# Patient Record
Sex: FEMALE | Race: WHITE | NOT HISPANIC OR LATINO | URBAN - METROPOLITAN AREA
[De-identification: names, ages, dates, MRNs, and addresses within clinical notes are randomized per-mention and may not be internally consistent; named-entity substitution may affect disease eponyms.]

---

## 2020-01-23 ENCOUNTER — OFFICE VISIT CONVERTED (OUTPATIENT)
Dept: FAMILY MEDICINE CLINIC | Facility: CLINIC | Age: 55
End: 2020-01-23
Attending: INTERNAL MEDICINE

## 2020-01-23 ENCOUNTER — HOSPITAL ENCOUNTER (OUTPATIENT)
Dept: LAB | Facility: HOSPITAL | Age: 55
Discharge: HOME | End: 2020-01-23
Attending: INTERNAL MEDICINE

## 2020-01-23 LAB
ALBUMIN SERPL-MCNC: 4.5 G/DL (ref 3.5–5)
ALBUMIN/GLOB SERPL: 1.5 {RATIO} (ref 1.4–2.6)
ALP SERPL-CCNC: 60 U/L (ref 53–141)
ALT SERPL-CCNC: 16 U/L (ref 10–40)
ANION GAP SERPL CALC-SCNC: 20 MMOL/L (ref 8–19)
AST SERPL-CCNC: 19 U/L (ref 15–50)
BASOPHILS # BLD AUTO: 0.05 10*3/UL (ref 0–0.2)
BASOPHILS NFR BLD AUTO: 0.9 % (ref 0–3)
BILIRUB SERPL-MCNC: 0.31 MG/DL (ref 0.2–1.3)
BUN SERPL-MCNC: 12 MG/DL (ref 5–25)
BUN/CREAT SERPL: 15 {RATIO} (ref 6–20)
CALCIUM SERPL-MCNC: 9.9 MG/DL (ref 8.7–10.4)
CHLORIDE SERPL-SCNC: 101 MMOL/L (ref 99–111)
CHOLEST SERPL-MCNC: 249 MG/DL (ref 107–200)
CHOLEST/HDLC SERPL: 3.4 {RATIO} (ref 3–6)
CONV ABS IMM GRAN: 0.02 10*3/UL (ref 0–0.2)
CONV CO2: 24 MMOL/L (ref 22–32)
CONV IMMATURE GRAN: 0.3 % (ref 0–1.8)
CONV TOTAL PROTEIN: 7.6 G/DL (ref 6.3–8.2)
CREAT UR-MCNC: 0.82 MG/DL (ref 0.5–0.9)
DEPRECATED RDW RBC AUTO: 47.1 FL (ref 36.4–46.3)
EOSINOPHIL # BLD AUTO: 0.2 10*3/UL (ref 0–0.7)
EOSINOPHIL # BLD AUTO: 3.5 % (ref 0–7)
ERYTHROCYTE [DISTWIDTH] IN BLOOD BY AUTOMATED COUNT: 12.9 % (ref 11.7–14.4)
EST. AVERAGE GLUCOSE BLD GHB EST-MCNC: 105 MG/DL
GFR SERPLBLD BASED ON 1.73 SQ M-ARVRAT: >60 ML/MIN/{1.73_M2}
GLOBULIN UR ELPH-MCNC: 3.1 G/DL (ref 2–3.5)
GLUCOSE SERPL-MCNC: 103 MG/DL (ref 65–99)
HBA1C MFR BLD: 5.3 % (ref 3.5–5.7)
HCT VFR BLD AUTO: 43 % (ref 37–47)
HDLC SERPL-MCNC: 73 MG/DL (ref 40–60)
HGB BLD-MCNC: 13.9 G/DL (ref 12–16)
LDLC SERPL CALC-MCNC: 160 MG/DL (ref 70–100)
LYMPHOCYTES # BLD AUTO: 1.72 10*3/UL (ref 1–5)
LYMPHOCYTES NFR BLD AUTO: 29.8 % (ref 20–45)
MCH RBC QN AUTO: 32 PG (ref 27–31)
MCHC RBC AUTO-ENTMCNC: 32.3 G/DL (ref 33–37)
MCV RBC AUTO: 98.9 FL (ref 81–99)
MONOCYTES # BLD AUTO: 0.49 10*3/UL (ref 0.2–1.2)
MONOCYTES NFR BLD AUTO: 8.5 % (ref 3–10)
NEUTROPHILS # BLD AUTO: 3.29 10*3/UL (ref 2–8)
NEUTROPHILS NFR BLD AUTO: 57 % (ref 30–85)
NRBC CBCN: 0 % (ref 0–0.7)
OSMOLALITY SERPL CALC.SUM OF ELEC: 292 MOSM/KG (ref 273–304)
PLATELET # BLD AUTO: 208 10*3/UL (ref 130–400)
PMV BLD AUTO: 11.3 FL (ref 9.4–12.3)
POTASSIUM SERPL-SCNC: 4.1 MMOL/L (ref 3.5–5.3)
RBC # BLD AUTO: 4.35 10*6/UL (ref 4.2–5.4)
SODIUM SERPL-SCNC: 141 MMOL/L (ref 135–147)
TRIGL SERPL-MCNC: 82 MG/DL (ref 40–150)
TSH SERPL-ACNC: 2.13 M[IU]/L (ref 0.27–4.2)
VLDLC SERPL-MCNC: 16 MG/DL (ref 5–37)
WBC # BLD AUTO: 5.77 10*3/UL (ref 4.8–10.8)

## 2020-01-31 ENCOUNTER — CONVERSION ENCOUNTER (OUTPATIENT)
Dept: FAMILY MEDICINE CLINIC | Facility: CLINIC | Age: 55
End: 2020-01-31

## 2020-01-31 ENCOUNTER — OFFICE VISIT CONVERTED (OUTPATIENT)
Dept: FAMILY MEDICINE CLINIC | Facility: CLINIC | Age: 55
End: 2020-01-31
Attending: INTERNAL MEDICINE

## 2020-02-14 ENCOUNTER — HOSPITAL ENCOUNTER (OUTPATIENT)
Dept: GENERAL RADIOLOGY | Facility: HOSPITAL | Age: 55
Discharge: HOME | End: 2020-02-14
Attending: INTERNAL MEDICINE

## 2020-07-23 ENCOUNTER — CONVERSION ENCOUNTER (OUTPATIENT)
Dept: FAMILY MEDICINE CLINIC | Facility: CLINIC | Age: 55
End: 2020-07-23

## 2020-07-23 ENCOUNTER — OFFICE VISIT CONVERTED (OUTPATIENT)
Dept: FAMILY MEDICINE CLINIC | Facility: CLINIC | Age: 55
End: 2020-07-23
Attending: INTERNAL MEDICINE

## 2020-07-23 ENCOUNTER — HOSPITAL ENCOUNTER (OUTPATIENT)
Dept: LAB | Facility: HOSPITAL | Age: 55
Discharge: HOME | End: 2020-07-23
Attending: INTERNAL MEDICINE

## 2020-07-23 ENCOUNTER — HOSPITAL ENCOUNTER (OUTPATIENT)
Dept: GENERAL RADIOLOGY | Facility: HOSPITAL | Age: 55
Discharge: HOME | End: 2020-07-23
Attending: INTERNAL MEDICINE

## 2020-07-23 LAB
CHOLEST SERPL-MCNC: 217 MG/DL (ref 107–200)
CHOLEST/HDLC SERPL: 3.1 {RATIO} (ref 3–6)
HDLC SERPL-MCNC: 71 MG/DL (ref 40–60)
LDLC SERPL CALC-MCNC: 128 MG/DL (ref 70–100)
TRIGL SERPL-MCNC: 88 MG/DL (ref 40–150)
VLDLC SERPL-MCNC: 18 MG/DL (ref 5–37)

## 2020-09-29 ENCOUNTER — OFFICE VISIT CONVERTED (OUTPATIENT)
Dept: GASTROENTEROLOGY | Facility: CLINIC | Age: 55
End: 2020-09-29
Attending: NURSE PRACTITIONER

## 2020-10-08 ENCOUNTER — HOSPITAL ENCOUNTER (OUTPATIENT)
Dept: ULTRASOUND IMAGING | Facility: HOSPITAL | Age: 55
Discharge: HOME | End: 2020-10-08
Attending: NURSE PRACTITIONER

## 2020-10-22 ENCOUNTER — HOSPITAL ENCOUNTER (OUTPATIENT)
Dept: GENERAL RADIOLOGY | Facility: HOSPITAL | Age: 55
Discharge: HOME | End: 2020-10-22
Attending: INTERNAL MEDICINE

## 2020-11-19 ENCOUNTER — HOSPITAL ENCOUNTER (OUTPATIENT)
Dept: GASTROENTEROLOGY | Facility: HOSPITAL | Age: 55
Setting detail: HOSPITAL OUTPATIENT SURGERY
Discharge: HOME | End: 2020-11-19
Attending: INTERNAL MEDICINE

## 2021-01-19 ENCOUNTER — OFFICE VISIT CONVERTED (OUTPATIENT)
Dept: GASTROENTEROLOGY | Facility: CLINIC | Age: 56
End: 2021-01-19
Attending: NURSE PRACTITIONER

## 2021-01-20 ENCOUNTER — CONVERSION ENCOUNTER (OUTPATIENT)
Dept: FAMILY MEDICINE CLINIC | Facility: CLINIC | Age: 56
End: 2021-01-20

## 2021-01-20 ENCOUNTER — OFFICE VISIT CONVERTED (OUTPATIENT)
Dept: FAMILY MEDICINE CLINIC | Facility: CLINIC | Age: 56
End: 2021-01-20
Attending: PHYSICIAN ASSISTANT

## 2021-05-10 NOTE — H&P
"   History and Physical      Patient Name: Zainab \"Michael\" Michelle   Patient ID: [id number]   Sex: Female   YOB: 1965    Primary Care Provider: Mikel Farrar DO   Referring Provider: Mikel Farrar DO    Visit Date: September 29, 2020    Provider: ARIELA Rose   Location: [facility]   Location Address: [address] [place] [address]  [id number]   Location Phone: [phone]          Chief Complaint  · Stomach pain   · diarrhea   · constipation   · blood in stool       History Of Present Illness  Zainab \"Michael\" SUKHWINDER Martinez is a 55 year old /White female who presents to the office today.      New pt presents w diarrhea alt w constipation, abd cramping, had issues at work d/t this. Started about 6 mo ago. Pt has epigastric pain, can be crampy, occurs after meals. Tried different diets and no help. Diarrhea usually occurs w pain, and is more predominant than constipation, has seen blood in stool. Bentyl is helping w pain, has been on 1 week, and sx not as often. Sx were several times a week before getting Bentyl.   Also has GERD, on Omeprazole for 20 years, states she can't eat w/o it; states has been on Reglan for 10 yrs for gastroparesis. +nausea, no vomiting. NO unint wt loss.   Last colonoscopy 10 yrs ago.     ER visit for abdominal pain 9/18/2020: CT the abdomen and pelvis with contrast was negative other than indeterminate lesion in the right kidney, CBC unremarkable, CMP unremarkable, lipase negative       Past Medical History  COPD (chronic obstructive pulmonary disease); GERD; Screening for breast cancer; Screening for colon cancer         Past Surgical History  Hysterectomy; Lumpectomy of left breast; Rhinoplasty         Medication List  Name Date Started Instructions   dicyclomine 20 mg oral tablet  --    duloxetine 60 mg oral capsule,delayed release(DR/EC) 07/23/2020 TAKE 1 CAPSULE BY MOUTH ONCE DAILY FOR 30 DAYS   Effexor  mg oral " capsule,extended release 24hr 07/23/2020 take 1 capsule (150 mg) by oral route once daily for 90 days   Mobic 7.5 mg oral tablet 07/23/2020 take 1 tablet by oral route 2 times a day as needed for 30 days   omeprazole 40 mg oral capsule,delayed release(DR/EC) 07/23/2020 take 1 capsule (40 mg) by oral route once daily before a meal for 90 days   Reglan 10 mg oral tablet 07/23/2020 take 1 tablet (10 mg) by oral route once daily for 90 days   Seroquel 50 mg oral tablet 07/23/2020 take 1 tablet by oral route daily for 90 days   tizanidine 4 mg oral capsule 07/23/2020 take 1 capsule (4 mg) by oral route every 8 hours as needed for 30 days         Allergy List  Codeine Phosphate; Codeine Sulfate         Family Medical History  Family history of colon cancer; Family history of lung cancer; Family history of heart disease; Family history of diabetes mellitus; Family history of hypertension; Family history of esophageal cancer; Family history of bladder cancer         Social History  Alcohol (Light); ; Sedentary; Tobacco (Never)         Review of Systems  · Constitutional  o Admits  o : good general health lately, no acute distress  · Eyes  o Denies  o : cataracts, glaucoma  · HENT  o Denies  o : hearing problems, trouble swallowing  · Cardiovascular  o Denies  o : heart valve problems, chest pain  · Respiratory  o Admits  o : shortness of breath  o Denies  o : asthma  · Gastrointestinal  o Denies  o : additional gastrointestinal symptoms except as noted in the HPI  · Genitourinary  o Admits  o : kidney stone  o Denies  o : dysuria  · Integument  o Denies  o : rashes, sores  · Neurologic  o Denies  o : strokes, seizure activity  · Musculoskeletal  o Admits  o : arthritis, bone or joint pain  · Psychiatric  o Admits  o : other mental disorders, pleasant affect--+Bipolar  o Denies  o : depression  · Heme-Lymph  o Denies  o : bleeding disorder  · Allergic-Immunologic  o Admits  o : seasonal allergies      Vitals  Date  "Time BP Position Site L\R Cuff Size HR RR TEMP (F) WT  HT  BMI kg/m2 BSA m2 O2 Sat HC       07/23/2020 08:05 /53 Sitting    76 - R   225lbs 6oz 5'  7\" 35.3 2.2 95 %    09/29/2020 02:24 /90 Sitting    77 - R  97.9 228lbs 2oz 5'  7\" 35.73 2.21           Physical Examination  · Constitutional  o Appearance  o : well developed, well-nourished, in no acute distress  · Head and Face  o Head  o :   § Inspection  § : atraumatic, normocephalic  · Eyes  o Sclerae  o : sclerae white, no sclerae icterus  · Neck  o Inspection/Palpation  o : supple  · Respiratory  o Respiratory Effort  o : breathing unlabored  o Inspection of Chest  o : normal appearance, no retractions  · Cardiovascular  o Peripheral Vascular System  o :   § Extremities  § : no cyanosis, clubbing or edema  · Gastrointestinal  o Abdominal Examination  o : soft, nontender to palpation--tender epigastric and RUQ  · Skin and Subcutaneous Tissue  o General Inspection  o : no lesions present, no rashes present  · Neurologic  o Mental Status Examination  o :   § Orientation  § : grossly oriented to person, place and time  § Speech/Language  § : communication ability within normal limits, voice quality normal, articulation of speech normal, no evidence of aphasia  § Attention  § : attention normal, concentration abilities normal  o Sensation  o : grossly intact  o Gait and Station  o :   § Gait Screening  § : normal gait  · Psychiatric  o General  o : Alert and oriented x3  o Mood and Affect  o : Mood and affect are appropriate to circumstances          Assessment  · Pre-op exam     V72.84/Z01.818  · Abdominal pain     789.00/R10.9  · Diarrhea     787.91/R19.7  · Gastroparesis     536.3/K31.84  reported  · Heartburn     787.1/R12  · Blood in stool     578.1/K92.1      Plan  · Orders  o RUQ US (right upper quadrant ultrasound) (04256) - - 09/29/2020  o [facility] Pre-Op Covid-19 Screening (63758) - - 09/29/2020  · Medications  o Golytely 236-22.74-6.74 -5.86 gram " oral recon soln   SIG: take as directed   DISP: (1) 4000 ml bottle with 0 refills  Prescribed on 09/29/2020     o hyoscyamine sulfate 0.125 mg sublingual tablet, sublingual   SIG: place 1 tablet under tongue by translingual route every 4 to 6 hours for 30 days PRN diarrhea/abd pain   DISP: (90) tablets with 1 refills  Prescribed on 09/29/2020     o dicyclomine 20 mg oral tablet   SIG: ---   DISP: (0) tablet with 0 refills  Discontinued on 09/29/2020     o Medications have been Reconciled  o Transition of Care or Provider Policy  · Instructions  o Please Sign Permit for: EGD/COLONOSCOPY  o Indication: Abd pain, persistent HB, diarrhea, blood in stool  o Surgical Risk and Benefits: Possible risks/complications, benefits, and alternatives to surgical or invasive procedure have been explained to patient and/or legal guardian; Patient has been evaluated and can tolerate anesthesia and/or sedation. Risks, benefits, and alternatives to anesthesia and sedation have been explained to patient and/or legal guardian.  o Follow Up after Procedure.  o Encouraged to follow-up with Primary Care Provider for preventative care.  o Patient was educated/instructed on their diagnosis, treatment and medications prior to discharge from the clinic today.  o Patient instructed to seek medical attention urgently for new or worsening symptoms.  o Pt is aware of long term risks of Reglan            Electronically Signed by: ARIELA Rose -Author on September 29, 2020 03:01:33 PM

## 2021-05-13 NOTE — PROGRESS NOTES
Progress Note      Patient Name: Zainab Martinez   Patient ID: [id number]   Sex: Female   YOB: 1965    Primary Care Provider: Mikel Farrar DO   Referring Provider: Mikel Farrar DO    Visit Date: July 23, 2020    Provider: Mikel Farrar DO   Location: [facility]   Location Address: [address] [place] [address], [address]  [place], [place]  [id number]   Location Phone: [phone]          Chief Complaint  · 6 month f/u  · Arthritis   · Neuropathy      History Of Present Illness  Zainab Martinez is a 55 year old /White female who presents for evaluation and treatment of:      Pt is here for 6 month f/u on arthritis and neuropathy.    Pt states that she fell in February, on her garage steps injury her right leg. Pt states that it was black and blue but has improved over time. Pt states that it still bothers her some and does occasionally have some swelling. Pt thinks that she could of fractured it and is hoping to get xray completed.    Pt wants to see about getting increase in duloxetine d/t to more cramps in her feet and legs. Patient states it does help some but feels like she got adjusted to the original and was becoming less effective.    Pt wants to get recheck cholesterol labs.    Patient hoping to get re-referred to GI as initial evaluation was cancelled due to coronavirus. Patient hoping to go see a GI specialist for her worsening gastroparesis for possible evaluation for gastric stimulator. Patient has been on Reglan for long period of time. I discussed with her risks of doing so.           Past Medical History  Disease Name Date Onset Notes   COPD (chronic obstructive pulmonary disease) --  --    GERD --  --    Screening for breast cancer 2018 In [place]   Screening for colon cancer --  In [place]         Past Surgical History  Procedure Name Date Notes   Hysterectomy --  --    Lumpectomy of left breast --  --    Rhinoplasty --  --          Medication List  Name Date  Started Instructions   duloxetine 60 mg oral capsule,delayed release(DR/EC) 07/23/2020 TAKE 1 CAPSULE BY MOUTH ONCE DAILY FOR 30 DAYS   Effexor  mg oral capsule,extended release 24hr 07/23/2020 take 1 capsule (150 mg) by oral route once daily for 90 days   Mobic 7.5 mg oral tablet 07/23/2020 take 1 tablet by oral route 2 times a day as needed for 30 days   omeprazole 40 mg oral capsule,delayed release(DR/EC) 07/23/2020 take 1 capsule (40 mg) by oral route once daily before a meal for 90 days   Reglan 10 mg oral tablet 07/23/2020 take 1 tablet (10 mg) by oral route once daily for 90 days   Seroquel 50 mg oral tablet 07/23/2020 take 1 tablet by oral route daily for 90 days   tizanidine 4 mg oral capsule 07/23/2020 take 1 capsule (4 mg) by oral route every 8 hours as needed for 30 days         Allergy List  Allergen Name Date Reaction Notes   Codeine Phosphate --  --  --    Codeine Sulfate --  --  --          Family Medical History  Disease Name Relative/Age Notes   Family history of colon cancer Grandfather (maternal)/   --    Family history of lung cancer Brother/   --    Family history of heart disease Aunt/  Father/  Mother/  Uncle/   --    Family history of diabetes mellitus Grandmother (maternal)/   --    Family history of hypertension Aunt/  Mother/  Uncle/   --    Family history of esophageal cancer Aunt/   --    Family history of bladder cancer Mother/   --          Social History  Finding Status Start/Stop Quantity Notes   Alcohol Light --/-- rarely --     --  --/-- --  --    Sedentary --  --/-- --  --    Tobacco Never --/-- --  --          Review of Systems  · Constitutional  o Denies  o : fatigue, night sweats  · Eyes  o Denies  o : double vision, blurred vision  · HENT  o Denies  o : vertigo, recent head injury  · Breasts  o Denies  o : abnormal changes in breast size, additional breast symptoms except as noted in the HPI  · Cardiovascular  o Denies  o : chest pain, irregular heart  "beats  · Respiratory  o Denies  o : shortness of breath, productive cough  · Gastrointestinal  o Admits  o : nausea, reflux, early satiety  o Denies  o : vomiting, abdominal pain  · Genitourinary  o Denies  o : dysuria, urinary retention  · Integument  o Denies  o : hair growth change, new skin lesions  · Neurologic  o Admits  o : incoordination, tingling or numbness  o Denies  o : altered mental status, seizures  · Musculoskeletal  o Admits  o : joint pain, leg pain, muscle cramps  o Denies  o : joint swelling, limitation of motion  · Endocrine  o Denies  o : cold intolerance, heat intolerance  · Psychiatric  o Admits  o : anxiety, depression  o Denies  o : suicidal ideation, homicidal ideation  · Heme-Lymph  o Denies  o : petechiae, lymph node enlargement or tenderness  · Allergic-Immunologic  o Denies  o : frequent illnesses      Vitals  Date Time BP Position Site L\R Cuff Size HR RR TEMP (F) WT  HT  BMI kg/m2 BSA m2 O2 Sat HC       01/31/2020 11:14 /82 Sitting    68 - R   220lbs 6oz 5'  7\" 34.52 2.17 97 %    07/23/2020 08:05 /53 Sitting    76 - R   225lbs 6oz 5'  7\" 35.3 2.2 95 %          Physical Examination  · Constitutional  o Appearance  o : alert, oriented, in no acute distress, well developed, well-nourished  · Eyes  o Vision  o : Conjuntivae: Normal, Sclerae white, Pupils: PERRL, Cornea: Clear, no lesions bilateral  · Ears, Nose, Mouth and Throat  o Ears  o : Ext. Ears: Normal shape, Non tender, EACs: Normal , Tragus intact bilaterally, Hearing: intact to conversational voice bilaterally  o Nose  o : No nasal discharge, Mucosa: normal, Septum: midline, Sinuses: Nontender  o Throat  o : Oropharynx: no inflmation or lesions, no purulence or drainage  · Neck  o Inspection/Palpation  o : Supple, no masses or tenderness, no deformities, Trachea: Midline, ROM: with in normal limits, no neck stiffness, no lymphadenopathy  o Thyroid  o : no thyomegaly, no palpabale masses "   · Respiratory  o Auscultation of Lungs  o : normal breath sounds throughout, no wheeze, rhonchi, or crackles  · Cardiovascular  o Heart  o : Regular rate and rhythm, Normal S1,S2 , No cardiac murmers, No S3 or S4 gallop or rubs  · Gastrointestinal  o Abdominal Examination  o : abdomen soft, nontender, non distended, no rigidity, gaurding, rebound tenderness, no ventral hernias present  o Liver and spleen  o : no hepatomegaly present, liver nontender to palpation, spleen not palpable  · Skin and Subcutaneous Tissue  o General Inspection  o : no rashes on visible skin, normal skin color, warm and dry  o Digits and Nails  o : no clubbing, cyanosis, deformities or edema present, normal appearing nails  · Neurologic  o Mental Status Examination  o : alert and oriented to time, place, and person. Gait and Station: normal gait, able to stand without difficulty. CN 2-12 grossly intact   · Psychiatric  o Judgment and Insight  o : judgment and insight intact  o Mood and Affect  o : normal mood and affect          Assessment  · Screening for depression     V79.0/Z13.89  · Hyperlipidemia     272.4/E78.5  · Polyarthralgia     719.49/M25.50  · Fall     E888.9/W19.XXXA  · Right leg pain     729.5/M79.604  Check tib/fib xray and ankle xray as well to look for possible fracture.  · Right ankle pain     719.47/M25.571  · Gastroparesis     536.3/K31.84  Will send another referral to GI for evaluation for possible gastric pacemaker.  · Chronic pain     338.29/G89.29  · Anxiety and depression       Anxiety disorder, unspecified     300.00/F41.9  Major depressive disorder, single episode, unspecified     300.00/F32.9  · Muscle spasm     728.85/M62.838    Problems Reconciled  Plan  · Orders  o ACO-18: Negative screen for clinical depression using a standardized tool () - V79.0/Z13.89 - 07/23/2020  o ACO - Pt declines to or was not able to provide an Advance Care Plan or name a Surrogate Decision Maker (1124F) - -  07/23/2020  o Lipid Panel [facility] (34454) - 272.4/E78.5 - 07/23/2020  o ACO-39: Current medications updated and reviewed () - - 07/23/2020  o ACO-15: Pneumococcal Vaccine Administered or Previously Received (4040F) - - 07/23/2020  o ACO-14: Influenza immunization was not administered for reasons documented () - - 07/23/2020  o Tib/Fib (Right) 2 views X-Ray [facility] ([id number]) - - 07/23/2020  o Ankle (Right) 3 views X-Ray [facility] ([id number]) - - 07/23/2020  o GASTROENTEROLOGY (GASTR) - 536.3/K31.84 - 07/23/2020   [facility], wants to talk about gastric stimulator  · Medications  o duloxetine 60 mg oral capsule,delayed release(DR/EC)   SIG: TAKE 1 CAPSULE BY MOUTH ONCE DAILY FOR 30 DAYS   DISP: (30) capsules with 6 refills  Adjusted on 07/23/2020     o Effexor  mg oral capsule,extended release 24hr   SIG: take 1 capsule (150 mg) by oral route once daily for 90 days   DISP: (90) capsule with 1 refills  Refilled on 07/23/2020     o Mobic 7.5 mg oral tablet   SIG: take 1 tablet by oral route 2 times a day as needed for 30 days   DISP: (60) tablets with 3 refills  Refilled on 07/23/2020     o omeprazole 40 mg oral capsule,delayed release(DR/EC)   SIG: take 1 capsule (40 mg) by oral route once daily before a meal for 90 days   DISP: (90) capsule with 1 refills  Refilled on 07/23/2020     o Reglan 10 mg oral tablet   SIG: take 1 tablet (10 mg) by oral route once daily for 90 days   DISP: (90) tablet with 1 refills  Refilled on 07/23/2020     o Seroquel 50 mg oral tablet   SIG: take 1 tablet by oral route daily for 90 days   DISP: (90) tablet with 1 refills  Refilled on 07/23/2020     o tizanidine 4 mg oral capsule   SIG: take 1 capsule (4 mg) by oral route every 8 hours as needed for 30 days   DISP: (90) capsules with 2 refills  Refilled on 07/23/2020     o amoxicillin 875 mg oral tablet   SIG: take 1 tablet (875 mg) by oral route every 12 hours for 7 days   DISP: (14) tablets with 0  refills  Discontinued on 07/23/2020     o cetirizine 5 mg oral tablet   SIG: take 1 tablet by oral route daily for 30 days   DISP: (30) tablets with 2 refills  Discontinued on 07/23/2020     o Cymbalta 30 mg oral capsule,delayed release(DR/EC)   SIG: take 1 capsule (30 mg) by oral route once daily for 30 days   DISP: (30) capsules with 3 refills  Discontinued on 07/23/2020     o fluticasone propionate 50 mcg/actuation nasal spray,suspension   SIG: spray 1 - 2 sprays in each nostril by intranasal route once daily   DISP: (1) 9.9 ml aer w/adap with 2 refills  Discontinued on 07/23/2020     o Zofran 4 mg oral tablet   SIG: take 1 tablet by oral route Q8H PRN for nausea   DISP: (21) tablets with 0 refills  Discontinued on 07/23/2020     o Medications have been Reconciled  o Transition of Care or Provider Policy  · Instructions  o Depression Screen completed and scanned into the EMR under the designated folder within the patient's documents.  o Today's PHQ-9 result is 1  o Recommended exercise program to assist with cholesterol, weight loss and overall health improvement.  o Advised that cheeses and other sources of dairy fats, animal fats, fast food, and the extras (candy, pastries, pies, doughnuts and cookies) all contain LDL raising nutrients. Advised to increase fruits, vegetables, whole grains, and to monitor portion sizes.   o (NSAID) Non-steroidal Anti-inflammatory medication was recommended/prescribed. Ensure you take this medication with food as directed. Do not use Motrin/ibuprofen/Advil while using the anti-inflammatory medication prescribed.  o Take all medications as prescribed/directed.  o Patient was educated/instructed on their diagnosis, treatment and medications prior to discharge from the clinic today.  o Patient instructed to seek medical attention urgently for new or worsening symptoms.  o Call the office with any concerns or questions.  o Bring all medicines with their bottles to each office  visit.  o Minutes spent with patient including greater than 50% in Education/Counseling/Care Coordination.  o Time spent with the patient was minutes, more than 50% face to face.  o Chronic conditions reviewed and taken into consideration for today's treatment plan.  o Discussed Covid-19 precautions including, but not limited to, social distancing, avoid touching your face, and hand washing.   o Electronically Identified Patient Education Materials Provided Electronically  · Disposition  o Follow up in three months            Electronically Signed by: Mikel Farrar DO -Author on July 29, 2020 01:25:05 PM

## 2021-05-14 VITALS
BODY MASS INDEX: 36.96 KG/M2 | TEMPERATURE: 98.4 F | WEIGHT: 235.5 LBS | HEART RATE: 80 BPM | DIASTOLIC BLOOD PRESSURE: 82 MMHG | SYSTOLIC BLOOD PRESSURE: 127 MMHG | OXYGEN SATURATION: 97 % | HEIGHT: 67 IN

## 2021-05-14 VITALS
HEIGHT: 67 IN | HEART RATE: 81 BPM | DIASTOLIC BLOOD PRESSURE: 85 MMHG | WEIGHT: 231.5 LBS | SYSTOLIC BLOOD PRESSURE: 141 MMHG | TEMPERATURE: 98.1 F | BODY MASS INDEX: 36.34 KG/M2

## 2021-05-14 VITALS
HEART RATE: 77 BPM | BODY MASS INDEX: 35.8 KG/M2 | HEIGHT: 67 IN | WEIGHT: 228.12 LBS | TEMPERATURE: 97.9 F | DIASTOLIC BLOOD PRESSURE: 90 MMHG | SYSTOLIC BLOOD PRESSURE: 149 MMHG

## 2021-05-14 NOTE — PROGRESS NOTES
Progress Note      Patient Name: Elisa Martinez   Patient ID: [id number]   Sex: Female   YOB: 1965    Primary Care Provider: Yu CORONADO   Referring Provider: Yu CORONADO    Visit Date: January 20, 2021    Provider: IVONNE Charles   Location: [facility]   Location Address: [address] [place] [address], [address]  [place], [place]  [id number]   Location Phone: [phone]          Chief Complaint  · follow up   · medication refills       History Of Present Illness  Elisa Martinez is a 56 year old /White female who presents for evaluation and treatment of:      patient is here for follow up and medication     She is moving to [place] next month.    Anxiety/Depression: She is taking Effexor, Duloxetine and Seroquel with good results. Denies SI. Denies side effects.    Gastroparesis/Gerd: Patient is taking Reglan, Omeprazole and hyoscyamine with good results. She is followed by Yu Rojas and Dr Wild and states she was just seen yesterday for follow up. CLN 11/2020. She is requesting refill on GI meds; since she just saw GI yesterday and is on Reglan with black box warning; I suggested she contact GI for med refills.    COPD: She states she uses Advair inhaler but doesn't know what strength it is, she only uses it prn. She is having some wheezing today.    OA: Pt has h/o previous back surgery/fusion and has oa and frequent muscle spasms and tightness in neck and upper back.                 Past Medical History  Disease Name Date Onset Notes   COPD (chronic obstructive pulmonary disease) --  --    GERD --  --    Screening for breast cancer 2018 In [place]   Screening for colon cancer --  In [place]         Past Surgical History  Procedure Name Date Notes   Hysterectomy --  --    Lumpectomy of left breast --  --    Rhinoplasty --  --          Medication List  Name Date Started Instructions   duloxetine 60 mg oral capsule,delayed  release(DR/EC) 07/23/2020 TAKE 1 CAPSULE BY MOUTH ONCE DAILY FOR 30 DAYS   Effexor  mg oral capsule,extended release 24hr 07/23/2020 take 1 capsule (150 mg) by oral route once daily for 90 days   hyoscyamine sulfate 0.125 mg sublingual tablet, sublingual 09/29/2020 place 1 tablet under tongue by translingual route every 4 to 6 hours for 30 days PRN diarrhea/abd pain   Mobic 7.5 mg oral tablet 07/23/2020 take 1 tablet by oral route 2 times a day as needed for 30 days   omeprazole 40 mg oral capsule,delayed release(DR/EC) 07/23/2020 take 1 capsule (40 mg) by oral route once daily before a meal for 90 days   Reglan 10 mg oral tablet 07/23/2020 take 1 tablet (10 mg) by oral route once daily for 90 days   Seroquel 50 mg oral tablet 07/23/2020 take 1 tablet by oral route daily for 90 days   tizanidine 4 mg oral capsule 07/23/2020 take 1 capsule (4 mg) by oral route every 8 hours as needed for 30 days         Allergy List  Allergen Name Date Reaction Notes   Codeine Phosphate --  --  --    Codeine Sulfate --  --  --        Allergies Reconciled  Family Medical History  Disease Name Relative/Age Notes   Family history of colon cancer Grandfather (maternal)/60s  Uncle/78   --    Family history of lung cancer Brother/   --    Family history of heart disease Aunt/  Father/  Mother/  Uncle/   --    Family history of diabetes mellitus Grandmother (maternal)/   --    Family history of hypertension Aunt/  Mother/  Uncle/   --    Family history of esophageal cancer Aunt/   --    Family history of bladder cancer Mother/   --          Social History  Finding Status Start/Stop Quantity Notes   Alcohol Light --/-- rarely --     --  --/-- --  --    Sedentary --  --/-- --  --    Tobacco Never --/-- --  --          Review of Systems  · Constitutional  o Denies  o : fever, fatigue, weight loss, weight gain  · Cardiovascular  o Denies  o : lower extremity edema, claudication, chest pressure,  "palpitations  · Respiratory  o Admits  o : wheezing  o Denies  o : shortness of breath, cough, hemoptysis, dyspnea on exertion  · Gastrointestinal  o Denies  o : nausea, vomiting, diarrhea, constipation, abdominal pain      Vitals  Date Time BP Position Site L\R Cuff Size HR RR TEMP (F) WT  HT  BMI kg/m2 BSA m2 O2 Sat FR L/min FiO2 HC       01/20/2021 11:13 /82 Sitting    80 - R  98.4 235lbs 8oz 5'  7\" 36.88 2.25 97 %  21%          Physical Examination  · Constitutional  o Appearance  o : well developed, well-nourished, no acute distress  · Head and Face  o Head  o : normocephalic, atraumatic  · Neck  o Inspection/Palpation  o : normal appearance, no masses or tenderness, trachea midline  o Thyroid  o : gland size normal, nontender, no nodules or masses present on palpation  · Respiratory  o Respiratory Effort  o : breathing unlabored  o Inspection of Chest  o : chest rise symmetric bilaterally  o Auscultation of Lungs  o : mild expiratory wheezing otherwise clear bilaterally.  · Cardiovascular  o Heart  o :   § Auscultation of Heart  § : regular rate and rhythm, no murmurs, gallops or rubs  o Peripheral Vascular System  o :   § Extremities  § : no edema  · Lymphatic  o Neck  o : no cervical lymphadenopathy, no supraclavicular lymphadenopathy  · Psychiatric  o Mood and Affect  o : mood normal, affect appropriate              Assessment  · COPD (chronic obstructive pulmonary disease)     496/J44.9  · Depression     311/F32.9  · GERD (gastroesophageal reflux disease)     530.81/K21.9  · Osteoarthritis     715.90/M19.90  · Other long term (current) drug therapy     V58.69/Z79.899  · Muscle spasm     728.85/M62.838  · Gastroparesis     536.3/K31.84       Patient is unsure of dose of Advair and will call back for refill. Pt to call GI for med refills of GI meds as just had an appt yesterday; unsure of plan of care and she is on Reglan which has a black box warning. Other meds refilled for 6mths.     Problems " Reconciled  Plan  · Orders  o ACO-14: Influenza immunization administered or previously received [facility] () - - 01/20/2021  o ACO-19: Colorectal cancer screening results documented and reviewed (3017F) - - 01/20/2021  o ACO-39: Current medications updated and reviewed (, 1159F) - - 01/20/2021  · Medications  o duloxetine 60 mg oral capsule,delayed release(DR/EC)   SIG: TAKE 1 CAPSULE BY MOUTH ONCE DAILY FOR 30 DAYS   DISP: (90) Capsule with 1 refills  Refilled on 01/20/2021     o Effexor  mg oral capsule,extended release 24hr   SIG: take 1 capsule (150 mg) by oral route once daily for 90 days   DISP: (90) Capsule with 1 refills  Refilled on 01/20/2021     o Mobic 7.5 mg oral tablet   SIG: take 1 tablet by oral route 2 times a day as needed for 90 days   DISP: (180) Tablet with 1 refills  Refilled on 01/20/2021     o Seroquel 50 mg oral tablet   SIG: take 1 tablet by oral route daily for 90 days   DISP: (90) Tablet with 1 refills  Refilled on 01/20/2021     o tizanidine 4 mg oral capsule   SIG: take 1 capsule (4 mg) by oral route every 8 hours as needed for 30 days   DISP: (90) Capsule with 2 refills  Refilled on 01/20/2021     o Medications have been Reconciled  o Transition of Care or Provider Policy  · Instructions  o Maintain a healthy weight. Avoid tight fitting clothes. Avoid fried, fatty foods, tomato sauce, chocolate, mint, garlic, onion, alcohol. caffeine. Eat smaller meals, dont lie down after a meal, dont smoke. Elevate the head of your bed 6-9 inches.  o Tylenol may be used as needed every 4-6 hours for pain.  o Patient is taking medications as prescribed and doing well.   o Patient was educated/instructed on their diagnosis, treatment and medications prior to discharge from the clinic today.  o Patient instructed to seek medical attention urgently for new or worsening symptoms.  o Call the office with any concerns or questions.  o Chronic conditions reviewed and taken into consideration for  today's treatment plan.  o Discussed Covid-19 precautions including, but not limited to, social distancing, avoid touching your face, and hand washing.   o Electronically Identified Patient Education Materials Provided Electronically            Electronically Signed by: IVONNE Charles -Author on January 20, 2021 12:12:01 PM

## 2021-05-14 NOTE — PROGRESS NOTES
Progress Note      Patient Name: Zainab Martinez   Patient ID: [id number]   Sex: Female   YOB: 1965    Primary Care Provider: Yu CORONADO   Referring Provider: Yu CORONADO    Visit Date: January 19, 2021    Provider: ARIELA Rose   Location: [facility] [address]   Location Address: [address] [place] [address]  [id number]   Location Phone: [phone]          Chief Complaint  · Follow up       History Of Present Illness  Zainab Martinez is a 56 year old /White female who presents to the office today.      Patient initially presented September 2020 with diarrhea alternating with constipation, abdominal cramping for 6 months.  Had seen blood in stool.  Bentyl helped a little bit with pain.  Also complained of GERD and on omeprazole for 20 years, reported being on Reglan for 10 years for gastroparesis, +nausea but no vomiting.  Hyoscyamine was prescribed and scopes were ordered.    10/8/2020 RUQ US:  fatty liver suggested, right kidney lesion further imaging was recommended  CT of the abdomen and pelvis with and without contrast ordered by PCP 10/22/2020: Moderately large colonic stool burden, proteinaceous or hemorrhagic cyst in the right kidney    EGD colonoscopy 11/19/2020: Small hiatal hernia, many mixed nonbleeding polyps 3 to 4 mm in the stomach body with biopsy takenfundic gland polyp, normal duodenumbiopsy negative; good prep, multiple nonbleeding diverticula seen in the sigmoid, normal mucosa otherwise, grade 2 internal hemorrhoids, random colon biopsies negative    Taking Reglan still, states she will have N/V without this. Risks have been discussed.  Pt states she's off keto diet, unfortunately she is feeling constipated now and is gaining weight. Pt does report seeing rectal bleeding w constipation episode.  Taking Hyoscyamine PRN abd cramps and it works well.         Past Medical History  COPD (chronic obstructive pulmonary  disease); GERD; Screening for breast cancer; Screening for colon cancer         Past Surgical History  Hysterectomy; Lumpectomy of left breast; Rhinoplasty         Medication List  Name Date Started Instructions   duloxetine 60 mg oral capsule,delayed release(DR/EC) 07/23/2020 TAKE 1 CAPSULE BY MOUTH ONCE DAILY FOR 30 DAYS   Effexor  mg oral capsule,extended release 24hr 07/23/2020 take 1 capsule (150 mg) by oral route once daily for 90 days   hyoscyamine sulfate 0.125 mg sublingual tablet, sublingual 09/29/2020 place 1 tablet under tongue by translingual route every 4 to 6 hours for 30 days PRN diarrhea/abd pain   Mobic 7.5 mg oral tablet 07/23/2020 take 1 tablet by oral route 2 times a day as needed for 30 days   omeprazole 40 mg oral capsule,delayed release(DR/EC) 07/23/2020 take 1 capsule (40 mg) by oral route once daily before a meal for 90 days   Reglan 10 mg oral tablet 07/23/2020 take 1 tablet (10 mg) by oral route once daily for 90 days   Seroquel 50 mg oral tablet 07/23/2020 take 1 tablet by oral route daily for 90 days   tizanidine 4 mg oral capsule 07/23/2020 take 1 capsule (4 mg) by oral route every 8 hours as needed for 30 days         Allergy List  Codeine Phosphate; Codeine Sulfate         Family Medical History  Family history of colon cancer; Family history of lung cancer; Family history of heart disease; Family history of diabetes mellitus; Family history of hypertension; Family history of esophageal cancer; Family history of bladder cancer         Social History  Alcohol (Light); ; Sedentary; Tobacco (Never)         Review of Systems  · Constitutional  o Admits  o : good general health lately, no acute distress  · Gastrointestinal  o Denies  o : additional gastrointestinal symptoms except as noted in the HPI  · Psychiatric  o Admits  o : pleasant affect      Physical Examination  · Constitutional  o Appearance  o : well developed, well-nourished, in no acute distress  · Head and  Face  o Head  o :   § Inspection  § : atraumatic, normocephalic  · Eyes  o Sclerae  o : sclerae white, no sclerae icterus  · Neck  o Inspection/Palpation  o : supple  · Respiratory  o Respiratory Effort  o : breathing unlabored  o Inspection of Chest  o : normal appearance, no retractions  · Cardiovascular  o Peripheral Vascular System  o :   § Extremities  § : no cyanosis, clubbing or edema  · Gastrointestinal  o Abdominal Examination  o : soft, nontender to palpation--mild tenderness LUQ  · Skin and Subcutaneous Tissue  o General Inspection  o : no lesions present, no rashes present  · Neurologic  o Mental Status Examination  o :   § Orientation  § : grossly oriented to person, place and time  § Speech/Language  § : communication ability within normal limits, voice quality normal, articulation of speech normal, no evidence of aphasia  § Attention  § : attention normal, concentration abilities normal  o Sensation  o : grossly intact  o Gait and Station  o :   § Gait Screening  § : normal gait  · Psychiatric  o General  o : Alert and oriented x3  o Mood and Affect  o : Mood and affect are appropriate to circumstances          Assessment  · Constipation     564.00/K59.00  · Diverticulosis     562.10/K57.90  · Fatty liver     571.8/K76.0  · Gastroparesis     536.3/K31.84  reported  · Hiatal hernia     553.3/K44.9  · Internal hemorrhoid     455.0/K64.8  · Fundic gland polyps of stomach, benign     211.1/D13.1      Plan  · Medications  o Anucort-HC 25 mg rectal suppository   SIG: insert 1 suppository (25 mg) by rectal route 2 times per day for 14 days   DISP: (28) Suppository with 0 refills  Prescribed on 01/19/2021     · Instructions  o Encouraged to follow-up with Primary Care Provider for preventative care.  o Patient was educated/instructed on their diagnosis, treatment and medications prior to discharge from the clinic today.  o Patient instructed to seek medical attention urgently for new or worsening  symptoms.  o F/U PRN. Advised pt to call if any GI symptoms such as change in bowel pattern, abd pain, wt loss, or blood in stool.   o Pt states she is moving to [place]. She was advised to get and take a copy of her records. She will f/u there for fatty liver. I had recommended checking labs and hepatitis panel but she states she will seek GI care once she's moved.   o Discussed with patient importance of small frequent meals, liquids over solids whenever possible. Recommended breakfast shake or soup for lunch as liquid alternatives as well as boost or ensure or other liquid nutritional supplement for snacks. Avoid high-fiber foods and high fat foods.   o Add Colace OTC QD-BID for constipation. Pt aware Hyoscyamine has side effects of constipation.             Electronically Signed by: ARIELA Rose -Author on January 19, 2021 04:51:58 PM

## 2021-05-15 VITALS
WEIGHT: 220.25 LBS | DIASTOLIC BLOOD PRESSURE: 81 MMHG | OXYGEN SATURATION: 96 % | SYSTOLIC BLOOD PRESSURE: 127 MMHG | BODY MASS INDEX: 34.57 KG/M2 | HEART RATE: 65 BPM | HEIGHT: 67 IN

## 2021-05-15 VITALS
SYSTOLIC BLOOD PRESSURE: 115 MMHG | HEIGHT: 67 IN | DIASTOLIC BLOOD PRESSURE: 53 MMHG | HEART RATE: 76 BPM | WEIGHT: 225.37 LBS | BODY MASS INDEX: 35.37 KG/M2 | OXYGEN SATURATION: 95 %

## 2021-05-15 VITALS
WEIGHT: 220.37 LBS | HEART RATE: 68 BPM | HEIGHT: 67 IN | SYSTOLIC BLOOD PRESSURE: 126 MMHG | OXYGEN SATURATION: 97 % | BODY MASS INDEX: 34.59 KG/M2 | DIASTOLIC BLOOD PRESSURE: 82 MMHG